# Patient Record
Sex: FEMALE | Race: WHITE | Employment: FULL TIME | URBAN - METROPOLITAN AREA
[De-identification: names, ages, dates, MRNs, and addresses within clinical notes are randomized per-mention and may not be internally consistent; named-entity substitution may affect disease eponyms.]

---

## 2017-09-18 ENCOUNTER — HOSPITAL ENCOUNTER (OUTPATIENT)
Age: 57
Discharge: HOME | End: 2017-09-18
Payer: COMMERCIAL

## 2017-09-18 PROCEDURE — 36415 COLL VENOUS BLD VENIPUNCTURE: CPT

## 2017-09-18 PROCEDURE — 86618 LYME DISEASE ANTIBODY: CPT

## 2017-09-19 LAB — LYME ANTIBODY: 0.36

## 2017-09-25 ENCOUNTER — HOSPITAL ENCOUNTER (OUTPATIENT)
Age: 57
Discharge: HOME | End: 2017-09-25
Payer: COMMERCIAL

## 2017-09-25 LAB
ANGIOTENSIN-CONVERTING ENZYME: 22 U/L (ref 8–52)
C-REACTIVE PROTEIN: 27.1 MG/L (ref 0–5)
CALCIUM IONIZED: 1.22 MMOL/L (ref 1.13–1.33)
SEDIMENTATION RATE, ERYTHROCYTE: 35 MM (ref 0–20)
TOXOPLASM IGM: 0.28 INDEX
TOXOPLASMA BLOOD FOR RATIO: <0.5 IU/ML
TSH SERPL DL<=0.05 MIU/L-ACNC: 1.24 MIU/L (ref 0.3–5)

## 2017-09-25 PROCEDURE — 86665 EPSTEIN-BARR CAPSID VCA: CPT

## 2017-09-25 PROCEDURE — 84480 ASSAY TRIIODOTHYRONINE (T3): CPT

## 2017-09-25 PROCEDURE — 86803 HEPATITIS C AB TEST: CPT

## 2017-09-25 PROCEDURE — 36415 COLL VENOUS BLD VENIPUNCTURE: CPT

## 2017-09-25 PROCEDURE — 86317 IMMUNOASSAY INFECTIOUS AGENT: CPT

## 2017-09-25 PROCEDURE — 86038 ANTINUCLEAR ANTIBODIES: CPT

## 2017-09-25 PROCEDURE — 86140 C-REACTIVE PROTEIN: CPT

## 2017-09-25 PROCEDURE — 86778 TOXOPLASMA ANTIBODY IGM: CPT

## 2017-09-25 PROCEDURE — 82164 ANGIOTENSIN I ENZYME TEST: CPT

## 2017-09-25 PROCEDURE — 83516 IMMUNOASSAY NONANTIBODY: CPT

## 2017-09-25 PROCEDURE — 87340 HEPATITIS B SURFACE AG IA: CPT

## 2017-09-25 PROCEDURE — 86664 EPSTEIN-BARR NUCLEAR ANTIGEN: CPT

## 2017-09-25 PROCEDURE — 86663 EPSTEIN-BARR ANTIBODY: CPT

## 2017-09-25 PROCEDURE — 86777 TOXOPLASMA ANTIBODY: CPT

## 2017-09-25 PROCEDURE — 86790 VIRUS ANTIBODY NOS: CPT

## 2017-09-25 PROCEDURE — 86644 CMV ANTIBODY: CPT

## 2017-09-25 PROCEDURE — 82306 VITAMIN D 25 HYDROXY: CPT

## 2017-09-25 PROCEDURE — 86645 CMV ANTIBODY IGM: CPT

## 2017-09-25 PROCEDURE — 85651 RBC SED RATE NONAUTOMATED: CPT

## 2017-09-25 PROCEDURE — 84443 ASSAY THYROID STIM HORMONE: CPT

## 2017-09-25 PROCEDURE — 84436 ASSAY OF TOTAL THYROXINE: CPT

## 2017-09-25 PROCEDURE — 82330 ASSAY OF CALCIUM: CPT

## 2017-09-25 PROCEDURE — 86704 HEP B CORE ANTIBODY TOTAL: CPT

## 2017-09-26 LAB
ANTI-NUCLEAR ANTIBODY (ANA): NEGATIVE
CMV IGM: 0.3
CYTOMEGALOVIRUS IGG ANTIBODY: 34.4
GLIADIN DEAMINIDATED PEPTIDE AB IGA: 0.1 U/ML
GLIADIN DEAMINIDATED PEPTIDE AB IGG: <0.4 U/ML
HBV SURFACE AB TITR SER: <3.5 MIU/ML
HEPATITIS B CORE TOTAL ANTIBODY: REACTIVE
HEPATITIS B SURFACE ANTIGEN: NONREACTIVE
HEPATITIS C ANTIBODY: NONREACTIVE
T3 TOTAL: 94 NG/DL (ref 80–200)
T4 TOTAL: 7.2 UG/DL (ref 4.5–12)
VITAMIN D 25-HYDROXY: 18.1 NG/ML (ref 30–100)

## 2017-09-30 LAB
EBV EARLY ANTIGEN AB, IGG: 89.8 U/ML (ref 0–10.9)
EBV NUCLEAR AG AB: 21 U/ML (ref 0–21.9)
EPSTEIN-BARR VCA IGG: >750 U/ML (ref 0–21.9)
EPSTEIN-BARR VCA IGM: <10 U/ML (ref 0–43.9)
HUMAN HERPES 6 AB: NORMAL

## 2017-10-05 LAB
SEND OUT REPORT: NORMAL
TEST NAME: NORMAL

## 2023-12-29 ENCOUNTER — HOSPITAL ENCOUNTER (OUTPATIENT)
Dept: MRI IMAGING | Age: 63
Discharge: HOME | End: 2023-12-29
Payer: COMMERCIAL

## 2023-12-29 DIAGNOSIS — R51.9 INTRACTABLE HEADACHE, UNSPECIFIED CHRONICITY PATTERN, UNSPECIFIED HEADACHE TYPE: ICD-10-CM

## 2023-12-29 PROCEDURE — 70553 MRI BRAIN STEM W/O & W/DYE: CPT

## 2023-12-29 PROCEDURE — A9579 GAD-BASE MR CONTRAST NOS,1ML: HCPCS | Performed by: NURSE PRACTITIONER

## 2023-12-29 PROCEDURE — 6360000004 HC RX CONTRAST MEDICATION: Performed by: NURSE PRACTITIONER

## 2023-12-29 RX ADMIN — GADOTERIDOL 15 ML: 279.3 INJECTION, SOLUTION INTRAVENOUS at 11:00

## 2024-01-24 ENCOUNTER — OFFICE VISIT (OUTPATIENT)
Dept: NEUROLOGY | Age: 64
End: 2024-01-24

## 2024-01-24 VITALS
WEIGHT: 162 LBS | OXYGEN SATURATION: 97 % | HEART RATE: 93 BPM | SYSTOLIC BLOOD PRESSURE: 130 MMHG | DIASTOLIC BLOOD PRESSURE: 82 MMHG | BODY MASS INDEX: 28 KG/M2

## 2024-01-24 DIAGNOSIS — F51.01 PRIMARY INSOMNIA: ICD-10-CM

## 2024-01-24 DIAGNOSIS — G93.89 BRAIN DYSFUNCTION: ICD-10-CM

## 2024-01-24 DIAGNOSIS — E78.2 MIXED HYPERLIPIDEMIA: ICD-10-CM

## 2024-01-24 DIAGNOSIS — I63.9 OCCIPITAL STROKE (HCC): Primary | ICD-10-CM

## 2024-01-24 DIAGNOSIS — E11.49 TYPE II DIABETES MELLITUS WITH NEUROLOGICAL MANIFESTATIONS (HCC): ICD-10-CM

## 2024-01-24 DIAGNOSIS — I67.82 CHRONIC CEREBRAL ISCHEMIA: ICD-10-CM

## 2024-01-24 DIAGNOSIS — F41.9 ANXIETY AND DEPRESSION: ICD-10-CM

## 2024-01-24 DIAGNOSIS — N64.9 BREAST DISORDER: ICD-10-CM

## 2024-01-24 DIAGNOSIS — Z86.73 H/O: STROKE: ICD-10-CM

## 2024-01-24 DIAGNOSIS — Z86.69 H/O MIGRAINE: ICD-10-CM

## 2024-01-24 DIAGNOSIS — R42 DIZZINESS: ICD-10-CM

## 2024-01-24 DIAGNOSIS — I47.9 PAROXYSMAL TACHYCARDIA (HCC): ICD-10-CM

## 2024-01-24 DIAGNOSIS — F32.A ANXIETY AND DEPRESSION: ICD-10-CM

## 2024-01-24 DIAGNOSIS — I10 PRIMARY HYPERTENSION: ICD-10-CM

## 2024-01-24 DIAGNOSIS — H54.7 VISUAL IMPAIRMENT: ICD-10-CM

## 2024-01-24 RX ORDER — GLIMEPIRIDE 2 MG/1
2 TABLET ORAL
COMMUNITY

## 2024-01-24 RX ORDER — BUDESONIDE AND FORMOTEROL FUMARATE DIHYDRATE 160; 4.5 UG/1; UG/1
AEROSOL RESPIRATORY (INHALATION)
COMMUNITY
Start: 2023-12-14

## 2024-01-24 RX ORDER — METOPROLOL SUCCINATE 50 MG/1
50 TABLET, EXTENDED RELEASE ORAL DAILY
COMMUNITY
Start: 2023-01-30

## 2024-01-24 RX ORDER — RIZATRIPTAN BENZOATE 10 MG/1
10 TABLET ORAL
COMMUNITY

## 2024-01-24 RX ORDER — DULAGLUTIDE 3 MG/.5ML
INJECTION, SOLUTION SUBCUTANEOUS
COMMUNITY
Start: 2023-12-28

## 2024-01-24 RX ORDER — DULOXETIN HYDROCHLORIDE 60 MG/1
CAPSULE, DELAYED RELEASE ORAL DAILY
COMMUNITY
Start: 2023-10-30

## 2024-01-24 RX ORDER — ROSUVASTATIN CALCIUM 40 MG/1
40 TABLET, COATED ORAL DAILY
COMMUNITY
Start: 2023-09-11

## 2024-01-24 RX ORDER — CLOPIDOGREL BISULFATE 75 MG/1
75 TABLET ORAL DAILY
Qty: 30 TABLET | Refills: 3 | Status: SHIPPED | OUTPATIENT
Start: 2024-01-24

## 2024-01-24 RX ORDER — EMPAGLIFLOZIN 25 MG/1
25 TABLET, FILM COATED ORAL EVERY MORNING
COMMUNITY

## 2024-01-24 RX ORDER — BUTALBITAL, ACETAMINOPHEN AND CAFFEINE 300; 40; 50 MG/1; MG/1; MG/1
CAPSULE ORAL
COMMUNITY
Start: 2021-08-04

## 2024-01-24 RX ORDER — CHOLECALCIFEROL (VITAMIN D3) 10(400)/ML
1 DROPS ORAL DAILY
COMMUNITY

## 2024-01-24 RX ORDER — ASPIRIN 81 MG/1
81 TABLET ORAL DAILY
COMMUNITY

## 2024-01-24 RX ORDER — NICOTINE POLACRILEX 4 MG/1
20 GUM, CHEWING ORAL DAILY
COMMUNITY
Start: 2021-12-22

## 2024-01-24 ASSESSMENT — ENCOUNTER SYMPTOMS
COUGH: 0
SHORTNESS OF BREATH: 0
CONSTIPATION: 0
BACK PAIN: 0
VOMITING: 0
ABDOMINAL PAIN: 0
NAUSEA: 0
VISUAL CHANGE: 1
PHOTOPHOBIA: 0
EYE REDNESS: 0
CHEST TIGHTNESS: 0
DIARRHEA: 0
FACIAL SWELLING: 0
SINUS PRESSURE: 0
VOICE CHANGE: 0
BLOOD IN STOOL: 0
EYE PAIN: 0
WHEEZING: 0
ABDOMINAL DISTENTION: 0
APNEA: 0
EYE ITCHING: 0
CHOKING: 0
TROUBLE SWALLOWING: 0
EYE DISCHARGE: 0
COLOR CHANGE: 0

## 2024-01-24 NOTE — PROGRESS NOTES
[facility]  Neurology    [address] [id number]  Phone:[phone]   Fax: [phone]        SUBJECTIVE:       PATIENT ID:  Brigida Macdonald is a   LEFT      HANDED 63 y.o. female.      Neurologic Problem  The patient's primary symptoms include clumsiness, a loss of balance and a visual change. The patient's pertinent negatives include no focal sensory loss, focal weakness, memory loss, near-syncope, slurred speech, syncope or weakness. Primary symptoms comment: H/O  LEFT    OCCIPITAL   STROKE      IN    DEC.  2023     PRESENTING    WITH  LEFT    OCCIPITAL  HEADACHES         BALANCE  PROBLEMS,    MILD   VISUAL  SYMPTOMS     and  SLEEP  DIFFICULTIES. This is a new problem. The neurological problem developed suddenly. The problem has been gradually improving since onset. Associated symptoms include headaches. Pertinent negatives include no abdominal pain, back pain, chest pain, confusion, dizziness, fatigue, fever, light-headedness, nausea, neck pain, palpitations, shortness of breath or vomiting. Past treatments include bed rest and sleep. The treatment provided mild relief. Her past medical history is significant for a CVA and mood changes. There is no history of a bleeding disorder, a clotting disorder, dementia, head trauma, liver disease or seizures.         History obtained from  The   PATIENT         and other  available   medical  records   were  Also  reviewed.          The  Duration,  Quality,  Severity,  Location,  Timing,  Context,  Modifying  Factors   Of   The   Chief   Complaint       And  Present  Illness  Was   Reviewed   In   Chronological   [facility]anner.                                          PATIENT'S  MAIN  CONCERNS INCLUDE :                     1)     H/O  LEFT    OCCIPITAL   STROKE      IN    DEC.  2023                                 PRESENTING    WITH  LEFT    OCCIPITAL  HEADACHES                                  BALANCE  PROBLEMS,    MILD   VISUAL  SYMPTOMS

## 2024-01-24 NOTE — PATIENT INSTRUCTIONS
* FALL   PRECAUTIONS.            *  USE   WALKING  ASSISTANCE  DEVICES     QUAD  CANE  /   WALKER        *    TO   AVOID   TO  SLEEP  IN   SUPINE  POSITION.    *      WEIGHT   LOSS.         *   ADEQUATE   FLUID  INTAKE   AND  ELECTROLYTE  BALANCE             * KEEP  DAIRY  OF   THE  NEUROLOGICAL  SYMPTOMS          *  TO  MAINTAIN  REGULAR  SLEEP  WAKE  CYCLES.     *   TO  HAVE  ADEQUATE  REST  AND   SLEEP    HOURS.          *    AVOID  USAGE OF   TOBACCO,  EXCESSIVE  ALCOHOL                AND   ILLEGAL   SUBSTANCES,  IF  ANY          *  Maintain   Healthy  Life Style    With   Periodic  Monitoring  Of         Any  Medical  Conditions  Including   Elevated  Blood  Pressure,  Lipid  Profile,       Blood  Sugar levels  And   Heart  Disease.                *   Period   Screening  For  Cancers  Involving  Breast,  Colon,         Lungs  And  Other  Organs  As  Applicable,           In consultation   With  Your  Primary Care Providers.                *  If   There is  Any  Significant  Worsening   Of  Current  Symptoms  And             Or  If    Any additional  New  Neurological  Symptoms  and          Significant  Concerns   Should  Call  [phone] or  Go  To  Emergency  Department            For  Further  Immediate  Evaluation.

## 2024-01-31 ENCOUNTER — HOSPITAL ENCOUNTER (OUTPATIENT)
Dept: NEUROLOGY | Age: 64
Discharge: HOME | End: 2024-01-31
Attending: PSYCHIATRY & NEUROLOGY
Payer: COMMERCIAL

## 2024-01-31 ENCOUNTER — HOSPITAL ENCOUNTER (OUTPATIENT)
Dept: INTERVENTIONAL RADIOLOGY/VASCULAR | Age: 64
Discharge: HOME | End: 2024-02-02
Attending: PSYCHIATRY & NEUROLOGY
Payer: COMMERCIAL

## 2024-01-31 DIAGNOSIS — Z86.73 H/O: STROKE: ICD-10-CM

## 2024-01-31 DIAGNOSIS — G93.89 BRAIN DYSFUNCTION: ICD-10-CM

## 2024-01-31 PROCEDURE — 93880 EXTRACRANIAL BILAT STUDY: CPT

## 2024-01-31 PROCEDURE — 93886 INTRACRANIAL COMPLETE STUDY: CPT

## 2024-01-31 PROCEDURE — 93892 TCD EMBOLI DETECT W/O INJ: CPT

## 2024-01-31 NOTE — PROGRESS NOTES
TCD Completed with Emboli Detection.    PCP: Elissa Bradley APRN - CNP    Ordering: Morris Albarado Neurologist    Interpreting Physician: Ivan Nicolas    Electronically signed by Jeanne Amaro RN on 1/31/2024 at 9:00 AM

## 2024-01-31 NOTE — PROCEDURES
[facility]                 [address] [place] [id number]                             Transcranial Doppler (TCD)      PATIENT NAME: LESLIE ARMANDO                     :        1960  MED REC NO:   [id number]                             ROOM:  ACCOUNT NO:   [id number]                           ADMIT DATE: 2024      PROVIDER:     Morris Albarado MD    DATE OF STUDY:  2024    TECHNIQUE:  Transcranial Doppler study of intracranial arteries was  performed using [employer] equipment with digital Doppler technology, with  high resolution [address] [place] M-mode display and Multigate Spectral Windows  and 2 MHz Doppler probes via temporal, suboccipital and orbital  approaches.    Transcranial Doppler study of the intracranial arteries was also  performed for emboli detection without intravenous microbubble injection  using continuous soundtrack, M-Mode with Multigate Spectral displays and  soundtrack displays with continuous bilateral monitoring.      CLINICAL DATA:  The patient is 63 years old with history of type 2  diabetes, previous history of stroke, hyperlipidemia, dizziness,  migraines, chronic cerebral ischemia.    The purpose of the study is to evaluate for stroke, intracranial focal  stenosis, flow abnormalities, vertebrobasilar insufficiency evaluations.    SUMMARY:  The mean flow velocities in the left middle cerebral artery  are low relatively with normal PI values.  Mean flow velocities in the  right middle cerebral artery is borderline with normal PI values.    Mean flow velocities in the right and left anterior cerebral arteries  appear fairly within normal limits.    Mean flow velocities in the posterior cerebral artery on the left side  is not recordable most likely due to technical difficulties, on the  right posterior cerebral artery appear within normal limits.    Mean flow velocities in the right and left vertebral arteries and  basilar

## 2024-02-21 ENCOUNTER — TELEPHONE (OUTPATIENT)
Dept: NEUROLOGY | Age: 64
End: 2024-02-21

## 2024-02-21 NOTE — TELEPHONE ENCOUNTER
Last Appt:  1/24/2024  Next Appt:   3/6/2024  Med verified in Epic       Patient has stopped taking plavix due to it causing polyarthritis pain. She is taking aspirin 81mg daily.    Do you recommend an additional alternative anticoagulant?

## 2024-03-04 ENCOUNTER — HOSPITAL ENCOUNTER (OUTPATIENT)
Age: 64
Discharge: HOME | End: 2024-03-06
Payer: COMMERCIAL

## 2024-03-04 VITALS
DIASTOLIC BLOOD PRESSURE: 77 MMHG | WEIGHT: 162 LBS | SYSTOLIC BLOOD PRESSURE: 139 MMHG | HEART RATE: 85 BPM | BODY MASS INDEX: 28.7 KG/M2 | HEIGHT: 63 IN

## 2024-03-04 DIAGNOSIS — I63.9 CEREBRAL INFARCTION (HCC): ICD-10-CM

## 2024-03-04 LAB
ECHO AO ASC DIAM: 2.9 CM
ECHO AO ASCENDING AORTA INDEX: 1.64 CM/M2
ECHO AO ROOT DIAM: 3 CM
ECHO AO ROOT INDEX: 1.69 CM/M2
ECHO AV AREA PEAK VELOCITY: 1.9 CM2
ECHO AV AREA/BSA PEAK VELOCITY: 1.1 CM2/M2
ECHO AV PEAK GRADIENT: 5 MMHG
ECHO AV PEAK VELOCITY: 1.2 M/S
ECHO AV VELOCITY RATIO: 0.58
ECHO BSA: 1.81 M2
ECHO BSA: 1.81 M2
ECHO EST RA PRESSURE: 3 MMHG
ECHO LA AREA 4C: 15.3 CM2
ECHO LA DIAMETER INDEX: 2.2 CM/M2
ECHO LA DIAMETER: 3.9 CM
ECHO LA MAJOR AXIS: 4.3 CM
ECHO LA TO AORTIC ROOT RATIO: 1.3
ECHO LA VOL MOD A4C: 44 ML (ref 22–52)
ECHO LA VOLUME INDEX MOD A4C: 25 ML/M2 (ref 16–34)
ECHO LV E' LATERAL VELOCITY: 7 CM/S
ECHO LV E' SEPTAL VELOCITY: 3 CM/S
ECHO LV EDV A2C: 62 ML
ECHO LV EDV A4C: 68 ML
ECHO LV EDV INDEX A4C: 38 ML/M2
ECHO LV EDV NDEX A2C: 35 ML/M2
ECHO LV EJECTION FRACTION A2C: 63 %
ECHO LV EJECTION FRACTION A4C: 61 %
ECHO LV EJECTION FRACTION BIPLANE: 63 % (ref 55–100)
ECHO LV ESV A2C: 23 ML
ECHO LV ESV A4C: 27 ML
ECHO LV ESV INDEX A2C: 13 ML/M2
ECHO LV ESV INDEX A4C: 15 ML/M2
ECHO LV FRACTIONAL SHORTENING: 34 % (ref 28–44)
ECHO LV INTERNAL DIMENSION DIASTOLE INDEX: 2.49 CM/M2
ECHO LV INTERNAL DIMENSION DIASTOLIC: 4.4 CM (ref 3.9–5.3)
ECHO LV INTERNAL DIMENSION SYSTOLIC INDEX: 1.64 CM/M2
ECHO LV INTERNAL DIMENSION SYSTOLIC: 2.9 CM
ECHO LV ISOVOLUMETRIC RELAXATION TIME (IVRT): 109 MS
ECHO LV IVSD: 1.2 CM (ref 0.6–0.9)
ECHO LV MASS 2D: 180 G (ref 67–162)
ECHO LV MASS INDEX 2D: 101.7 G/M2 (ref 43–95)
ECHO LV POSTERIOR WALL DIASTOLIC: 1.1 CM (ref 0.6–0.9)
ECHO LV RELATIVE WALL THICKNESS RATIO: 0.5
ECHO LVOT AREA: 3.1 CM2
ECHO LVOT DIAM: 2 CM
ECHO LVOT PEAK GRADIENT: 2 MMHG
ECHO LVOT PEAK VELOCITY: 0.7 M/S
ECHO MV A VELOCITY: 0.65 M/S
ECHO MV E DECELERATION TIME (DT): 222 MS
ECHO MV E VELOCITY: 0.47 M/S
ECHO MV E/A RATIO: 0.72
ECHO MV E/E' LATERAL: 6.71
ECHO MV E/E' RATIO (AVERAGED): 11.19
ECHO MV MAX VELOCITY: 0.7 M/S
ECHO MV PEAK GRADIENT: 2 MMHG
ECHO PV MAX VELOCITY: 0.9 M/S
ECHO PV PEAK GRADIENT: 3 MMHG
ECHO RIGHT VENTRICULAR SYSTOLIC PRESSURE (RVSP): 24 MMHG
ECHO TV PEAK GRADIENT: 2 MMHG
ECHO TV REGURGITANT MAX VELOCITY: 2.3 M/S
ECHO TV REGURGITANT PEAK GRADIENT: 21 MMHG

## 2024-03-04 PROCEDURE — 93306 TTE W/DOPPLER COMPLETE: CPT | Performed by: INTERNAL MEDICINE

## 2024-03-04 PROCEDURE — 93271 ECG/MONITORING AND ANALYSIS: CPT

## 2024-03-04 PROCEDURE — 93306 TTE W/DOPPLER COMPLETE: CPT

## 2024-03-06 ENCOUNTER — OFFICE VISIT (OUTPATIENT)
Dept: NEUROLOGY | Age: 64
End: 2024-03-06
Payer: COMMERCIAL

## 2024-03-06 VITALS
HEART RATE: 97 BPM | WEIGHT: 162 LBS | DIASTOLIC BLOOD PRESSURE: 68 MMHG | SYSTOLIC BLOOD PRESSURE: 116 MMHG | RESPIRATION RATE: 16 BRPM | BODY MASS INDEX: 28.7 KG/M2 | OXYGEN SATURATION: 97 %

## 2024-03-06 DIAGNOSIS — F51.01 PRIMARY INSOMNIA: ICD-10-CM

## 2024-03-06 DIAGNOSIS — E11.49 TYPE II DIABETES MELLITUS WITH NEUROLOGICAL MANIFESTATIONS (HCC): ICD-10-CM

## 2024-03-06 DIAGNOSIS — I47.9 PAROXYSMAL TACHYCARDIA (HCC): ICD-10-CM

## 2024-03-06 DIAGNOSIS — R42 DIZZINESS: ICD-10-CM

## 2024-03-06 DIAGNOSIS — F32.A ANXIETY AND DEPRESSION: ICD-10-CM

## 2024-03-06 DIAGNOSIS — E78.2 MIXED HYPERLIPIDEMIA: ICD-10-CM

## 2024-03-06 DIAGNOSIS — I67.82 CHRONIC CEREBRAL ISCHEMIA: ICD-10-CM

## 2024-03-06 DIAGNOSIS — Z86.69 H/O MIGRAINE: ICD-10-CM

## 2024-03-06 DIAGNOSIS — H54.7 VISUAL IMPAIRMENT: ICD-10-CM

## 2024-03-06 DIAGNOSIS — F41.9 ANXIETY AND DEPRESSION: ICD-10-CM

## 2024-03-06 DIAGNOSIS — Z86.73 H/O: STROKE: ICD-10-CM

## 2024-03-06 DIAGNOSIS — I10 PRIMARY HYPERTENSION: ICD-10-CM

## 2024-03-06 DIAGNOSIS — I63.9 OCCIPITAL STROKE (HCC): Primary | ICD-10-CM

## 2024-03-06 PROCEDURE — G8427 DOCREV CUR MEDS BY ELIG CLIN: HCPCS | Performed by: PSYCHIATRY & NEUROLOGY

## 2024-03-06 PROCEDURE — G8419 CALC BMI OUT NRM PARAM NOF/U: HCPCS | Performed by: PSYCHIATRY & NEUROLOGY

## 2024-03-06 PROCEDURE — 2022F DILAT RTA XM EVC RTNOPTHY: CPT | Performed by: PSYCHIATRY & NEUROLOGY

## 2024-03-06 PROCEDURE — G8484 FLU IMMUNIZE NO ADMIN: HCPCS | Performed by: PSYCHIATRY & NEUROLOGY

## 2024-03-06 PROCEDURE — 3074F SYST BP LT 130 MM HG: CPT | Performed by: PSYCHIATRY & NEUROLOGY

## 2024-03-06 PROCEDURE — 3078F DIAST BP <80 MM HG: CPT | Performed by: PSYCHIATRY & NEUROLOGY

## 2024-03-06 PROCEDURE — 99214 OFFICE O/P EST MOD 30 MIN: CPT | Performed by: PSYCHIATRY & NEUROLOGY

## 2024-03-06 PROCEDURE — 1036F TOBACCO NON-USER: CPT | Performed by: PSYCHIATRY & NEUROLOGY

## 2024-03-06 PROCEDURE — 3046F HEMOGLOBIN A1C LEVEL >9.0%: CPT | Performed by: PSYCHIATRY & NEUROLOGY

## 2024-03-06 PROCEDURE — 3017F COLORECTAL CA SCREEN DOC REV: CPT | Performed by: PSYCHIATRY & NEUROLOGY

## 2024-03-06 RX ORDER — UBROGEPANT 100 MG/1
100 TABLET ORAL 2 TIMES DAILY PRN
Qty: 30 TABLET | Refills: 1 | Status: SHIPPED | OUTPATIENT
Start: 2024-03-06

## 2024-03-06 RX ORDER — ASPIRIN 325 MG
325 TABLET ORAL DAILY
COMMUNITY

## 2024-03-06 ASSESSMENT — ENCOUNTER SYMPTOMS
CONSTIPATION: 0
DIARRHEA: 0
EYE DISCHARGE: 0
ABDOMINAL DISTENTION: 0
APNEA: 0
COLOR CHANGE: 0
SHORTNESS OF BREATH: 0
FACIAL SWELLING: 0
TROUBLE SWALLOWING: 0
EYE REDNESS: 0
PHOTOPHOBIA: 0
WHEEZING: 0
EYE ITCHING: 0
BLOOD IN STOOL: 0
BACK PAIN: 0
SINUS PRESSURE: 0
EYE PAIN: 0
CHOKING: 0
VOICE CHANGE: 0
CHEST TIGHTNESS: 0

## 2024-03-06 ASSESSMENT — PATIENT HEALTH QUESTIONNAIRE - PHQ9
SUM OF ALL RESPONSES TO PHQ QUESTIONS 1-9: 0
1. LITTLE INTEREST OR PLEASURE IN DOING THINGS: 0
SUM OF ALL RESPONSES TO PHQ QUESTIONS 1-9: 0
SUM OF ALL RESPONSES TO PHQ QUESTIONS 1-9: 0
SUM OF ALL RESPONSES TO PHQ9 QUESTIONS 1 & 2: 0
2. FEELING DOWN, DEPRESSED OR HOPELESS: 0
SUM OF ALL RESPONSES TO PHQ QUESTIONS 1-9: 0

## 2024-03-06 NOTE — PROGRESS NOTES
may have occurred.      GENERAL PATIENT INSTRUCTIONS:     A Healthy Lifestyle: Care Instructions  Your Care Instructions  A healthy lifestyle can help you feel good, stay at ahealthy weight, and have plenty of energy for both work and play. A healthy lifestyle is something you can share with your whole family.  A healthy lifestyle also can lower your risk for serious health problems, such ashigh blood pressure, heart disease, and diabetes.  You can follow a few steps listed below to improve your health and the health of your family.  Follow-up careis a key part of your treatment and safety. Be sure to make and go to all appointments, and call your doctor if you are having problems. It’s also a good idea to know your test results and keep a list of the medicines you take.  How can you care for yourself at home?  Do not eat too much sugar, fat, or fast foods. You can still have dessert and treats nowand then. The goal is moderation.  Start small to improve your eating habits. Pay attention to portion sizes, drink less juice and soda pop, and eat more fruits and vegetables.  Eat a healthy amount of food. A 3-ounce serving of meat, for example, is about the size of a deck of cards. Fill the rest of your plate with vegetables and whole grains.  Limit theamount of soda and sports drinks you have every day. Drink more water when you are thirsty.  Eat at least 5 servings of fruits and vegetables every day. It may seem like a lot, but it is not hard to reach this goal. Aserving or helping is 1 piece of fruit, 1 cup of vegetables, or 2 cups of leafy, raw vegetables. Have an apple or some carrot sticks as an afternoon snack instead of a candy bar. Try to have fruits and/or vegetables at everymeal.  Make exercise part of your daily routine. You may want to start with simple activities, such as walking, bicycling, or slow swimming. Try darío active 30 to 60 minutes every day. You do not need to do all 30 to 60 minutes all at

## 2024-04-04 LAB — ECHO BSA: 1.81 M2

## 2024-08-13 ENCOUNTER — TELEPHONE (OUTPATIENT)
Dept: NEUROLOGY | Age: 64
End: 2024-08-13

## 2024-08-13 NOTE — TELEPHONE ENCOUNTER
Last Appt:  3/6/2024  Next Appt:   9/5/2024  Med verified in Epic     Received all from Cardiology Dr. King's office re:  Patient stopped taking plavix 6 months ago d/t causing polyarthrits symptoms.     Per Dr. King, please review cardiac event monitor 3/4/24 which reports episodes of A-fib.     Would you like to make any med changes - eliquis or xarelto?  Or any consideration for a loop recorder implant?